# Patient Record
Sex: FEMALE | Race: WHITE
[De-identification: names, ages, dates, MRNs, and addresses within clinical notes are randomized per-mention and may not be internally consistent; named-entity substitution may affect disease eponyms.]

---

## 2020-01-01 ENCOUNTER — HOSPITAL ENCOUNTER (EMERGENCY)
Dept: HOSPITAL 39 - ER | Age: 25
Discharge: HOME | End: 2020-01-01
Payer: COMMERCIAL

## 2020-01-01 VITALS — OXYGEN SATURATION: 100 % | SYSTOLIC BLOOD PRESSURE: 129 MMHG | DIASTOLIC BLOOD PRESSURE: 93 MMHG

## 2020-01-01 VITALS — TEMPERATURE: 96.4 F

## 2020-01-01 DIAGNOSIS — Z88.1: ICD-10-CM

## 2020-01-01 DIAGNOSIS — G43.909: Primary | ICD-10-CM

## 2020-01-01 DIAGNOSIS — Z88.2: ICD-10-CM

## 2020-01-01 DIAGNOSIS — Z3A.38: ICD-10-CM

## 2020-01-01 DIAGNOSIS — O99.89: ICD-10-CM

## 2020-01-01 RX ADMIN — PROMETHAZINE HYDROCHLORIDE ONE MG: 25 TABLET ORAL at 15:42

## 2020-01-01 RX ADMIN — HYDROCODONE BITARTRATE AND ACETAMINOPHEN ONE EA: 7.5; 325 TABLET ORAL at 15:41

## 2020-01-01 NOTE — ED.PDOC
History of Present Illness





- General


Chief Complaint: Headache


Stated Complaint: migraine headache


Time Seen by Provider: 01/01/20 15:21


Source: patient


Exam Limitations: no limitations





- History of Present Illness


Initial Comments: 





the patient's 22-year-old  female presented to the emergency room 

secondary to her recurrent migraine headache.  The patient has been getting a 

migraine headache every month or so for the last year.  The patient has 28 weeks

pregnant according to her.  Headache started about 3 hours ago.  It is in its 

typical pattern with some left-sided numbness and tingling.  No loss of since 

patient.  Mild blurry vision.  Mild nausea.  No vomiting.  No new symptoms.  No 

head trauma.  No altered mental status.  Fetal heart tones were obtained.head

ache is moderate in intensity.


Timing/Duration: 1-3 hours


Severity: moderate


Improving Factors: nothing


Worsening Factors: nothing


Associated Symptoms: denies symptoms


Allergies/Adverse Reactions: 


Allergies





Ceftriaxone [From Rocephin] Allergy (Verified 01/01/20 15:35)


   


Sulfa Antibiotics Allergy (Verified 01/01/20 15:35)


   





Home Medications: 


Ambulatory Orders





Prenatal Vit W/ Ferrous Fumara [Prenatal] 1 tab PO DAILY 01/01/20 











Review of Systems





- Review of Systems


Constitutional: States: malaise


EENTM: States: blurred vision - transient


Respiratory: States: no symptoms reported


Cardiology: States: no symptoms reported


Gastrointestinal/Abdominal: States: no symptoms reported


Genitourinary: States: no symptoms reported


Musculoskeletal: States: no symptoms reported


Skin: States: no symptoms reported


Neurological: States: see HPI, headache


Endocrine: States: no symptoms reported


All other Systems: No Change from Baseline





Past Medical History (General)





- Patient Medical History


Hx Stroke: No


Hx Congestive Heart Failure: No


Hx Diabetes: No


Surgical History: tonsillectomy





- Vaccination History


Hx Influenza Vaccination: Yes





- Social History


Hx Tobacco Use: No





- Female History


Patient is a Female of Child Bearing Age (10 -59 yrs old): Yes


Patient Pregnant: Yes


Expected Date of Delivery:: 03/21/20


Hx Gestational Age: 28





Family Medical History





- Family History


  ** Mother


Family History: Unknown





Physical Exam





- Physical Exam


General Appearance: Alert, Comfortable, No apparent distress


Eye Exam: bilateral normal


Ears, Nose, Throat: hearing grossly normal, normal pharynx


Neck: full range of motion - no nuchal rigidity or meningeal signs, supple


Respiratory: no respiratory distress, no accessory muscle use


Cardiovascular/Chest: normal peripheral pulses, no edema, other - regular rate


Peripheral Pulses: radial,right: 2+, radial,left: 2+


Gastrointestinal/Abdominal: non tender, soft


Rectal Exam: deferred


Back Exam: no CVA tenderness, no vertebral tenderness


Extremity: normal range of motion, non-tender, normal inspection, no pedal 

edema, normal capillary refill


Neurologic: CNs II-XII nml as tested, no motor/sensory deficits - there are no 

actual neurological definable deficits at this time., alert, normal mood/affect,

 oriented x 3


Skin Exam: normal color


Comments: 





                               Vital Signs - 24 hr











  01/01/20





  15:32


 


Temperature 96.3 F L


 


Pulse Rate [ 103 H





Left Brachial] 


 


Respiratory 16





Rate 


 


Blood Pressure 129/93





[Left Arm] 


 


O2 Sat by Pulse 100





Oximetry 














Progress





- Progress


Progress: 





01/01/20 16:46


the patient is a 24-year-old  female presenting to emergency room 

secondary to a recurrent migraine headache.  The patient is feeling better after

a dose of nausea and pain medication.  She needs to keep herself well-hydrated. 

The patient may benefit from a workup with neurology after delivery if the 

headaches persist.  ER warnings were given. there are no obvious neurological 

deficits at this time.





mimi lee [phone]





Departure





- Departure


Clinical Impression: 


Migraine


Qualifiers:


 Migraine type: unspecified Status migrainosus presence: without status mi

grainosus Intractability: not intractable Qualified Code(s): G43.909 - Migraine,

unspecified, not intractable, without status migrainosus





Disposition: Discharge to Home or Self Care


Condition: Fair


Departure Forms:  ED Discharge - Pt. Copy, Patient Portal Self Enrollment


Diet: regular diet


Activity: increase activity as tolerated


Home Medications: 


Ambulatory Orders





Prenatal Vit W/ Ferrous Fumara [Prenatal] 1 tab PO DAILY 01/01/20 








Additional Instructions: 


the patient is a 24-year-old  female presenting to emergency room 

secondary to a recurrent migraine headache.  The patient is feeling better after

 a dose of nausea and pain medication.  She needs to keep herself well-hydrated.

  The patient may benefit from a workup with neurology after delivery if the 

headaches persist.  ER warnings were given